# Patient Record
Sex: MALE | Race: WHITE | NOT HISPANIC OR LATINO | Employment: FULL TIME | URBAN - METROPOLITAN AREA
[De-identification: names, ages, dates, MRNs, and addresses within clinical notes are randomized per-mention and may not be internally consistent; named-entity substitution may affect disease eponyms.]

---

## 2018-12-26 ENCOUNTER — OFFICE VISIT (OUTPATIENT)
Dept: URGENT CARE | Facility: CLINIC | Age: 23
End: 2018-12-26
Payer: COMMERCIAL

## 2018-12-26 VITALS
HEART RATE: 75 BPM | HEIGHT: 70 IN | SYSTOLIC BLOOD PRESSURE: 120 MMHG | TEMPERATURE: 97.5 F | OXYGEN SATURATION: 99 % | BODY MASS INDEX: 25.77 KG/M2 | DIASTOLIC BLOOD PRESSURE: 78 MMHG | RESPIRATION RATE: 18 BRPM | WEIGHT: 180 LBS

## 2018-12-26 DIAGNOSIS — K62.5 RECTAL BLEED: Primary | ICD-10-CM

## 2018-12-26 PROCEDURE — 99203 OFFICE O/P NEW LOW 30 MIN: CPT | Performed by: EMERGENCY MEDICINE

## 2018-12-26 RX ORDER — DOCUSATE SODIUM 100 MG/1
100 CAPSULE, LIQUID FILLED ORAL 2 TIMES DAILY
Qty: 10 CAPSULE | Refills: 0 | Status: SHIPPED | OUTPATIENT
Start: 2018-12-26

## 2018-12-26 NOTE — PATIENT INSTRUCTIONS
Anal Fissure   WHAT YOU NEED TO KNOW:   An anal fissure is a cut or tear in the tissue inside your anus  An anal fissure may be acute or chronic  An acute anal fissure is usually small and shallow and often heals without treatment  A chronic fissure may last longer than a month and will usually require treatment  A chronic anal fissure comes back after treatment  DISCHARGE INSTRUCTIONS:   Medicine:   · Topical medicine: Topical medicine may be put just inside your anus  This medicine may help your anal muscle relax and increase blood flow to your anus  This medicine may contain anesthesia to help decrease your pain  Your healthcare provider will teach you the right way to use topical medicine  · Stool softeners: Your healthcare provider may also give you medicine that makes your bowel movements softer  This helps prevent constipation  You will be less likely to strain and cause an anal fissure if you are not constipated  · Take your medicine as directed  Contact your healthcare provider if you think your medicine is not helping or if you have side effects  Tell him or her if you are allergic to any medicine  Keep a list of the medicines, vitamins, and herbs you take  Include the amounts, and when and why you take them  Bring the list or the pill bottles to follow-up visits  Carry your medicine list with you in case of an emergency  Follow up with your healthcare provider as directed: Your healthcare provider will need to make sure your anal fissure heals  Write down your questions so you remember to ask them during your visits  Bathing: You may need to soak in a warm tub or take a sitz bath  This may help decrease pain and swelling  You may need to do this more than once a day  Ask your healthcare provider how to use a sitz bath and how often you should bathe  Bowel care:  Do not ignore the urge to have a bowel movement  Do not strain  Clean the area gently after every bowel movement     Nutrition:  Eat foods that are high in fiber to help keep your bowel movements soft  High-fiber foods include fruits, vegetables, and whole grains  Drink more liquids to help soften your bowel movements  This will help prevent you from straining  Ask your healthcare provider how much liquid you should drink each day  Sexual intercourse:  Avoid anal intercourse for as long as directed by your healthcare provider  Anal intercourse may make it harder for your anal fissure to heal  It may also tear more tissue around your anus  Contact your healthcare provider if:   · You have a fever  · You still have pain after taking pain medicine  · You are unable to have a bowel movement  · You have spasms in your anus that do not stop  · You have questions or concerns about your condition or care  Seek care immediately or call 911 if:   · You have very bad pain in or around your anus  · You have bleeding from your anus that does not stop  © 2017 [employer] Information is for End User's use only and may not be sold, redistributed or otherwise used for commercial purposes  All illustrations and images included in CareNotes® are the copyrighted property of [employer]  or Albreto Lopez  The above information is an  only  It is not intended as medical advice for individual conditions or treatments  Talk to your doctor, nurse or pharmacist before following any medical regimen to see if it is safe and effective for you  Rectal Bleeding   WHAT YOU NEED TO KNOW:   What can cause rectal bleeding? · Constipation    · Hemorrhoids (swollen blood vessels in your rectum)    · Anal fissures (tears in the tissue inside your anus)    · Medical conditions, such as cancer, colitis, or diverticulitis     · Growths, such as tumors or polyps    · Medical treatments, such as radiation or rectal surgery  What increases my risk for rectal bleeding?    · Older age    · Certain medicines, such as blood thinners and NSAIDs    · Medical conditions, such as inflammatory bowel disease, liver disease, or HIV  What other signs and symptoms may happen with rectal bleeding? You may have pain in your rectum or anus  You may also have abdominal pain or cramping  How is the cause of rectal bleeding diagnosed? · Rectal exam:  Your healthcare provider may gently insert a gloved finger into your anus  He will collect a bowel movement sample and send it to a lab for tests  · Blood tests: You may need blood taken to check for anemia (low amount of red blood cells)  · CT scan: This test is also called a CAT scan  An x-ray machine uses a computer to take pictures of the organs and blood vessels in your abdomen  The pictures may show problems that could cause bleeding  You may be given a dye before the pictures are taken to help healthcare providers see the pictures better  Tell the healthcare provider if you have ever had an allergic reaction to contrast dye  · Colonoscopy: This is a procedure to look inside your lower bowel  It may show where the bleeding is coming from and what is causing it  A tube with a light on the end will be put into your anus and then moved into your colon  If your healthcare provider finds a growth, he may remove it  · Endoscopy: This is a procedure to look inside your upper bowel  It may show where the bleeding is coming from and what is causing it  A tube with a light on the end is inserted into your throat and moved down into your stomach and upper bowel  If your healthcare provider finds a growth, he may remove it  He may put a shot of medicine in bleeding areas to narrow the blood vessels and stop the bleeding  Heat, laser, or electric currents may also be used to make the blood clot  How is rectal bleeding treated? · Medicine:      ¨ Pain medicine: You may be given a prescription medicine to decrease pain   Do not wait until the pain is severe before you take this medicine  ¨ Vasoconstrictors: This medicine decreases the size of your blood vessels and may help stop the bleeding  ¨ Iron supplement:  Iron helps your body make more red blood cells  ¨ Steroids: This medicine decreases inflammation in your rectum  It may be applied as a cream, ointment, or lotion  · IV:  You may need an IV if you are dehydrated and need extra liquids  · Blood transfusion:  You will get whole or parts of blood through an IV during a transfusion  Blood is tested for diseases, such as hepatitis and HIV, to be sure it is safe  · Surgery: You may need surgery to remove hemorrhoids, tumors, or polyps  What are the risks of rectal bleeding? · You may have abdominal pain or damage to nearby organs and blood vessels with surgery  Even with treatment, rectal bleeding may continue  Or, it may go away for a time and start again  · Without treatment, you may continue to have pain and cramping  You may develop anemia  You may need a blood transfusion  You may lose a large amount of blood  This can be life-threatening  How can I manage my symptoms? Ask your healthcare provider how much liquid to drink each day and which liquids are best for you  This will help prevent dehydration and constipation  When should I contact my healthcare provider? · You have a fever  · Your rectal bleeding stopped for a time, but has started again  · You have nausea  · You have cold, sweaty, pale skin  · You have changes in your bowel movements, such as diarrhea  · You have questions or concerns about your condition or care  When should I seek immediate care or call 911? · You are breathing faster than usual     · You are dizzy, lightheaded, or feel faint  · You are confused or cannot think clearly  · You urinate less than usual or not at all  · Your rectal bleeding is constant or heavy  · You have severe abdominal pain or cramping    CARE AGREEMENT:   You have the right to help plan your care  Learn about your health condition and how it may be treated  Discuss treatment options with your caregivers to decide what care you want to receive  You always have the right to refuse treatment  The above information is an  only  It is not intended as medical advice for individual conditions or treatments  Talk to your doctor, nurse or pharmacist before following any medical regimen to see if it is safe and effective for you  © 2017 [address]  Information is for End User's use only and may not be sold, redistributed or otherwise used for commercial purposes  All illustrations and images included in CareNotes® are the copyrighted property of [employer]  or Alberto Lopez

## 2018-12-26 NOTE — PROGRESS NOTES
[facility]        NAME: Alexy Cuevas is a 24 y o  male  : 1995    MRN: [id number]  DATE: 2018  TIME: 5:59 PM    Assessment and Plan   Rectal bleed [K62 5]  1  Rectal bleed  docusate sodium (COLACE) 100 mg capsule         Patient Instructions     Patient Instructions   Anal Fissure   WHAT YOU NEED TO KNOW:   An anal fissure is a cut or tear in the tissue inside your anus  An anal fissure may be acute or chronic  An acute anal fissure is usually small and shallow and often heals without treatment  A chronic fissure may last longer than a month and will usually require treatment  A chronic anal fissure comes back after treatment  DISCHARGE INSTRUCTIONS:   Medicine:   · Topical medicine: Topical medicine may be put just inside your anus  This medicine may help your anal muscle relax and increase blood flow to your anus  This medicine may contain anesthesia to help decrease your pain  Your healthcare provider will teach you the right way to use topical medicine  · Stool softeners: Your healthcare provider may also give you medicine that makes your bowel movements softer  This helps prevent constipation  You will be less likely to strain and cause an anal fissure if you are not constipated  · Take your medicine as directed  Contact your healthcare provider if you think your medicine is not helping or if you have side effects  Tell him or her if you are allergic to any medicine  Keep a list of the medicines, vitamins, and herbs you take  Include the amounts, and when and why you take them  Bring the list or the pill bottles to follow-up visits  Carry your medicine list with you in case of an emergency  Follow up with your healthcare provider as directed: Your healthcare provider will need to make sure your anal fissure heals  Write down your questions so you remember to ask them during your visits  Bathing: You may need to soak in a warm tub or take a sitz bath   This may help decrease pain and swelling  You may need to do this more than once a day  Ask your healthcare provider how to use a sitz bath and how often you should bathe  Bowel care:  Do not ignore the urge to have a bowel movement  Do not strain  Clean the area gently after every bowel movement  Nutrition:  Eat foods that are high in fiber to help keep your bowel movements soft  High-fiber foods include fruits, vegetables, and whole grains  Drink more liquids to help soften your bowel movements  This will help prevent you from straining  Ask your healthcare provider how much liquid you should drink each day  Sexual intercourse:  Avoid anal intercourse for as long as directed by your healthcare provider  Anal intercourse may make it harder for your anal fissure to heal  It may also tear more tissue around your anus  Contact your healthcare provider if:   · You have a fever  · You still have pain after taking pain medicine  · You are unable to have a bowel movement  · You have spasms in your anus that do not stop  · You have questions or concerns about your condition or care  Seek care immediately or call 911 if:   · You have very bad pain in or around your anus  · You have bleeding from your anus that does not stop  © 2017 [facility] Information is for End User's use only and may not be sold, redistributed or otherwise used for commercial purposes  All illustrations and images included in CareNotes® are the copyrighted property of [employer]  or Alberto Lopez  The above information is an  only  It is not intended as medical advice for individual conditions or treatments  Talk to your doctor, nurse or pharmacist before following any medical regimen to see if it is safe and effective for you  Rectal Bleeding   WHAT YOU NEED TO KNOW:   What can cause rectal bleeding?    · Constipation    · Hemorrhoids (swollen blood vessels in your rectum)    · Anal fissures (tears in the tissue inside your anus)    · Medical conditions, such as cancer, colitis, or diverticulitis     · Growths, such as tumors or polyps    · Medical treatments, such as radiation or rectal surgery  What increases my risk for rectal bleeding? · Older age    · Certain medicines, such as blood thinners and NSAIDs    · Medical conditions, such as inflammatory bowel disease, liver disease, or HIV  What other signs and symptoms may happen with rectal bleeding? You may have pain in your rectum or anus  You may also have abdominal pain or cramping  How is the cause of rectal bleeding diagnosed? · Rectal exam:  Your healthcare provider may gently insert a gloved finger into your anus  He will collect a bowel movement sample and send it to a lab for tests  · Blood tests: You may need blood taken to check for anemia (low amount of red blood cells)  · CT scan: This test is also called a CAT scan  An x-ray machine uses a computer to take pictures of the organs and blood vessels in your abdomen  The pictures may show problems that could cause bleeding  You may be given a dye before the pictures are taken to help healthcare providers see the pictures better  Tell the healthcare provider if you have ever had an allergic reaction to contrast dye  · Colonoscopy: This is a procedure to look inside your lower bowel  It may show where the bleeding is coming from and what is causing it  A tube with a light on the end will be put into your anus and then moved into your colon  If your healthcare provider finds a growth, he may remove it  · Endoscopy: This is a procedure to look inside your upper bowel  It may show where the bleeding is coming from and what is causing it  A tube with a light on the end is inserted into your throat and moved down into your stomach and upper bowel  If your healthcare provider finds a growth, he may remove it   He may put a shot of medicine in bleeding areas to narrow the blood vessels and stop the bleeding  Heat, laser, or electric currents may also be used to make the blood clot  How is rectal bleeding treated? · Medicine:      ¨ Pain medicine: You may be given a prescription medicine to decrease pain  Do not wait until the pain is severe before you take this medicine  ¨ Vasoconstrictors: This medicine decreases the size of your blood vessels and may help stop the bleeding  ¨ Iron supplement:  Iron helps your body make more red blood cells  ¨ Steroids: This medicine decreases inflammation in your rectum  It may be applied as a cream, ointment, or lotion  · IV:  You may need an IV if you are dehydrated and need extra liquids  · Blood transfusion:  You will get whole or parts of blood through an IV during a transfusion  Blood is tested for diseases, such as hepatitis and HIV, to be sure it is safe  · Surgery: You may need surgery to remove hemorrhoids, tumors, or polyps  What are the risks of rectal bleeding? · You may have abdominal pain or damage to nearby organs and blood vessels with surgery  Even with treatment, rectal bleeding may continue  Or, it may go away for a time and start again  · Without treatment, you may continue to have pain and cramping  You may develop anemia  You may need a blood transfusion  You may lose a large amount of blood  This can be life-threatening  How can I manage my symptoms? Ask your healthcare provider how much liquid to drink each day and which liquids are best for you  This will help prevent dehydration and constipation  When should I contact my healthcare provider? · You have a fever  · Your rectal bleeding stopped for a time, but has started again  · You have nausea  · You have cold, sweaty, pale skin  · You have changes in your bowel movements, such as diarrhea  · You have questions or concerns about your condition or care  When should I seek immediate care or call 911?    · You are breathing faster than usual     · You are dizzy, lightheaded, or feel faint  · You are confused or cannot think clearly  · You urinate less than usual or not at all  · Your rectal bleeding is constant or heavy  · You have severe abdominal pain or cramping  CARE AGREEMENT:   You have the right to help plan your care  Learn about your health condition and how it may be treated  Discuss treatment options with your caregivers to decide what care you want to receive  You always have the right to refuse treatment  The above information is an  only  It is not intended as medical advice for individual conditions or treatments  Talk to your doctor, nurse or pharmacist before following any medical regimen to see if it is safe and effective for you  © 2017 [address] [employer]  Information is for End User's use only and may not be sold, redistributed or otherwise used for commercial purposes  All illustrations and images included in CareNotes® are the copyrighted property of A [employer]  or Alberto Lopez  Follow up with PCP in 3-5 days  Proceed to  ER if symptoms worsen  Chief Complaint     Chief Complaint   Patient presents with    Rectal Bleeding     noticed red blood upon wiping after bowel movement on Monday  History of Present Illness       Patient claims he noted a smear a bright red blood on toilet tissue today after an uneventful bowel movement  He claims the bowel movement was not painful, bloody or black  He denies similar symptoms in the past   He denies any rectal or anal pain at present  Review of Systems   Review of Systems   Constitutional: Negative for appetite change, chills and fever  Cardiovascular: Negative for palpitations  Gastrointestinal: Positive for anal bleeding  Negative for abdominal distention, abdominal pain, blood in stool, constipation, diarrhea, nausea, rectal pain and vomiting  Musculoskeletal: Negative for back pain  Skin: Negative for pallor  Neurological: Negative for syncope, light-headedness and headaches  Current Medications       Current Outpatient Prescriptions:     docusate sodium (COLACE) 100 mg capsule, Take 1 capsule (100 mg total) by mouth 2 (two) times a day, Disp: 10 capsule, Rfl: 0    Current Allergies     Allergies as of 12/26/2018    (No Known Allergies)            The following portions of the patient's history were reviewed and updated as appropriate: allergies, current medications, past family history, past medical history, past social history, past surgical history and problem list      Past Medical History:   Diagnosis Date    Known health problems: none        Past Surgical History:   Procedure Laterality Date    WRIST FRACTURE SURGERY         Family History   Problem Relation Age of Onset    No Known Problems Mother          Medications have been verified  Objective   /78 (BP Location: Left arm, Patient Position: Sitting, Cuff Size: Large)   Pulse 75   Temp 97 5 °F (36 4 °C) (Tympanic)   Resp 18   Ht 5' 10" (1 778 m)   Wt 81 6 kg (180 lb)   SpO2 99%   BMI 25 83 kg/m²        Physical Exam     Physical Exam   Constitutional: He is oriented to person, place, and time  He appears well-developed and well-nourished  No distress  HENT:   Right Ear: Tympanic membrane and ear canal normal    Left Ear: Tympanic membrane and ear canal normal    Nose: Mucosal edema and rhinorrhea present  Mouth/Throat: Posterior oropharyngeal erythema present  Abdominal: Soft  Bowel sounds are normal  He exhibits no distension  There is no tenderness  Genitourinary: Rectum normal  Rectal exam shows guaiac negative stool  Genitourinary Comments: No visible or palpable hemorrhoids  Stool guaiac negative  Neurological: He is alert and oriented to person, place, and time  Skin: Skin is warm and dry  He is not diaphoretic  No pallor  Psychiatric: He has a normal mood and affect  Nursing note and vitals reviewed

## 2022-11-19 ENCOUNTER — APPOINTMENT (OUTPATIENT)
Dept: LAB | Facility: HOSPITAL | Age: 27
End: 2022-11-19

## 2022-11-19 ENCOUNTER — OFFICE VISIT (OUTPATIENT)
Dept: LAB | Facility: HOSPITAL | Age: 27
End: 2022-11-19

## 2022-11-19 DIAGNOSIS — Z01.812 ENCOUNTER FOR PREPROCEDURAL LABORATORY EXAMINATION: ICD-10-CM

## 2022-11-19 LAB
ANION GAP SERPL CALCULATED.3IONS-SCNC: 7 MMOL/L (ref 4–13)
BASOPHILS # BLD AUTO: 0.03 THOUSANDS/ÂΜL (ref 0–0.1)
BASOPHILS NFR BLD AUTO: 1 % (ref 0–1)
BUN SERPL-MCNC: 26 MG/DL (ref 5–25)
CALCIUM SERPL-MCNC: 9.2 MG/DL (ref 8.3–10.1)
CHLORIDE SERPL-SCNC: 107 MMOL/L (ref 96–108)
CO2 SERPL-SCNC: 31 MMOL/L (ref 21–32)
CREAT SERPL-MCNC: 0.95 MG/DL (ref 0.6–1.3)
EOSINOPHIL # BLD AUTO: 0.32 THOUSAND/ÂΜL (ref 0–0.61)
EOSINOPHIL NFR BLD AUTO: 7 % (ref 0–6)
ERYTHROCYTE [DISTWIDTH] IN BLOOD BY AUTOMATED COUNT: 11.9 % (ref 11.6–15.1)
GFR SERPL CREATININE-BSD FRML MDRD: 109 ML/MIN/1.73SQ M
GLUCOSE SERPL-MCNC: 57 MG/DL (ref 65–140)
HCT VFR BLD AUTO: 46.8 % (ref 36.5–49.3)
HGB BLD-MCNC: 15.5 G/DL (ref 12–17)
IMM GRANULOCYTES # BLD AUTO: 0.01 THOUSAND/UL (ref 0–0.2)
IMM GRANULOCYTES NFR BLD AUTO: 0 % (ref 0–2)
LYMPHOCYTES # BLD AUTO: 1.87 THOUSANDS/ÂΜL (ref 0.6–4.47)
LYMPHOCYTES NFR BLD AUTO: 40 % (ref 14–44)
MCH RBC QN AUTO: 30.6 PG (ref 26.8–34.3)
MCHC RBC AUTO-ENTMCNC: 33.1 G/DL (ref 31.4–37.4)
MCV RBC AUTO: 93 FL (ref 82–98)
MONOCYTES # BLD AUTO: 0.34 THOUSAND/ÂΜL (ref 0.17–1.22)
MONOCYTES NFR BLD AUTO: 7 % (ref 4–12)
NEUTROPHILS # BLD AUTO: 2.15 THOUSANDS/ÂΜL (ref 1.85–7.62)
NEUTS SEG NFR BLD AUTO: 45 % (ref 43–75)
NRBC BLD AUTO-RTO: 0 /100 WBCS
PLATELET # BLD AUTO: 238 THOUSANDS/UL (ref 149–390)
PMV BLD AUTO: 9.9 FL (ref 8.9–12.7)
POTASSIUM SERPL-SCNC: 4 MMOL/L (ref 3.5–5.3)
RBC # BLD AUTO: 5.06 MILLION/UL (ref 3.88–5.62)
SODIUM SERPL-SCNC: 145 MMOL/L (ref 135–147)
WBC # BLD AUTO: 4.72 THOUSAND/UL (ref 4.31–10.16)

## 2022-11-20 LAB
ATRIAL RATE: 87 BPM
P AXIS: 76 DEGREES
PR INTERVAL: 156 MS
QRS AXIS: 92 DEGREES
QRSD INTERVAL: 90 MS
QT INTERVAL: 360 MS
QTC INTERVAL: 433 MS
T WAVE AXIS: 66 DEGREES
VENTRICULAR RATE: 87 BPM

## 2023-10-20 ENCOUNTER — OFFICE VISIT (OUTPATIENT)
Dept: URGENT CARE | Facility: CLINIC | Age: 28
End: 2023-10-20
Payer: COMMERCIAL

## 2023-10-20 VITALS
HEART RATE: 110 BPM | BODY MASS INDEX: 25.91 KG/M2 | HEIGHT: 70 IN | DIASTOLIC BLOOD PRESSURE: 76 MMHG | TEMPERATURE: 97.8 F | WEIGHT: 181 LBS | OXYGEN SATURATION: 96 % | RESPIRATION RATE: 20 BRPM | SYSTOLIC BLOOD PRESSURE: 139 MMHG

## 2023-10-20 DIAGNOSIS — S01.81XA LACERATION OF OTHER PART OF HEAD WITHOUT FOREIGN BODY, INITIAL ENCOUNTER: Primary | ICD-10-CM

## 2023-10-20 PROCEDURE — 12011 RPR F/E/E/N/L/M 2.5 CM/<: CPT

## 2023-10-20 PROCEDURE — 99213 OFFICE O/P EST LOW 20 MIN: CPT

## 2023-10-20 RX ORDER — MULTIVITAMIN
1 CAPSULE ORAL DAILY
COMMUNITY

## 2023-10-20 RX ORDER — CHLORAMPHENICOL PALMITATE
POWDER (GRAM) MISCELLANEOUS
COMMUNITY
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
## 2023-10-20 NOTE — PATIENT INSTRUCTIONS
Suture removal in 3-5 days  Standard wound care. Cleanse daily   Monitor for signs of infection   Follow up with PCP in 3-5 days. Proceed to  ER if symptoms worsen. Care For Your Stitches   AMBULATORY CARE:   Stitches,  or sutures, are used to close cuts and wounds on the skin. Stitches need to be removed after your wound has healed. Seek care immediately if:   Your stitches come apart. Blood soaks through your bandages. You suddenly cannot move your injured joint. You have sudden numbness around your wound. You see red streaks coming from your wound. Contact your healthcare provider if:   You have a fever and chills. Your wound is red, warm, swollen, or leaking pus. There is a bad smell coming from your wound. You have increased pain in the wound area. You have questions or concerns about your condition or care. Care for your stitches:   Protect the stitches. You may need to cover your stitches with a bandage for 24 to 48 hours, or as directed. Do not bump or hit the suture area. This could open the wound. Do not trim or shorten the ends of your stitches. If they rub on your clothing, put a gauze bandage between the stitches and your clothes. Clean the area as directed. Carefully wash your wound with soap and water. For mouth and lip wounds, rinse your mouth after meals and at bedtime. Ask your healthcare provider what to use to rinse your mouth. If you have a scalp wound, you may gently wash your hair every 2 days with mild shampoo. Do not use hair products, such as hair spray. Check your wound for signs of infection when you clean it. Signs include redness, swelling, and pus. Keep the area dry as directed. Wait 12 to 24 hours after you receive your stitches before you take a shower. Take showers instead of baths. Do not take a bath or swim. Your healthcare provider will give you instructions for bathing with your stitches.     Help your wound heal:   Elevate your wound.  Keep your wound above the level of your heart as often as you can. This will help decrease swelling and pain. Prop the area on pillows or blankets, if possible, to keep it elevated comfortably. Limit activity. Do not stretch the skin around your wound. This will help prevent bleeding and swelling. Follow up with your healthcare provider as directed: You may need to return to have your stitches removed. Write down your questions so you remember to ask them during your visits. © Copyright [facility] 2023 Information is for End User's use only and may not be sold, redistributed or otherwise used for commercial purposes. The above information is an  only. It is not intended as medical advice for individual conditions or treatments. Talk to your doctor, nurse or pharmacist before following any medical regimen to see if it is safe and effective for you.

## 2023-10-27 ENCOUNTER — OFFICE VISIT (OUTPATIENT)
Dept: URGENT CARE | Facility: CLINIC | Age: 28
End: 2023-10-27
Payer: COMMERCIAL

## 2023-10-27 VITALS
DIASTOLIC BLOOD PRESSURE: 76 MMHG | RESPIRATION RATE: 18 BRPM | BODY MASS INDEX: 25.77 KG/M2 | HEART RATE: 76 BPM | WEIGHT: 180 LBS | SYSTOLIC BLOOD PRESSURE: 133 MMHG | TEMPERATURE: 97.1 F | OXYGEN SATURATION: 97 % | HEIGHT: 70 IN

## 2023-10-27 DIAGNOSIS — Z48.02 ENCOUNTER FOR REMOVAL OF SUTURES: Primary | ICD-10-CM

## 2023-10-27 PROCEDURE — 99211 OFF/OP EST MAY X REQ PHY/QHP: CPT

## 2023-10-27 NOTE — PROGRESS NOTES
[facility]        NAME: Hiram Booth is a 28 y.o. male  : 1995    MRN: [id number]  DATE: 2023  TIME: 12:12 PM    Assessment and Plan   Encounter for removal of sutures [Z48.02]  1. Encounter for removal of sutures  Suture removal        Suture removal    Date/Time: 10/27/2023 11:55 AM    Performed by: EDUARDO Hernandez  Authorized by: Nicki Ragland MD  Universal Protocol:  Procedure performed by: MIRANDA Wills)  Consent: Verbal consent obtained. Risks and benefits: risks, benefits and alternatives were discussed  Consent given by: patient  Patient understanding: patient states understanding of the procedure being performed  Patient identity confirmed: verbally with patient      Patient location:  Clinic  Location:     Laterality:  Right    Location:  [address] [place] [address] location:  Eyebrow    Eyebrow location:  R eyebrow  Procedure details: Tools used:  Suture removal kit    Wound appearance:  No sign(s) of infection and good wound healing    Number of sutures removed:  3  Post-procedure details:     Post-removal:  No dressing applied    Patient tolerance of procedure: Tolerated well, no immediate complications    No sign of infection. Good wound healing. 3 sutures removed by MIRANDA Wills under my supervision. Tolerated well. Encouraged continued supportive measures. Standard wound care. Monitor for signs of infection. Follow up with PCP in 3-5 days or proceed to emergency department for worsening symptoms. Patient verbalized understanding of instructions given. Patient Instructions     Patient Instructions   Stitches removed  Standard wound care  Monitor for signs of infection   Follow up with PCP in 3-5 days. Proceed to  ER if symptoms worsen. Stitches Removal   AMBULATORY CARE:   What you need to know about stitches removal:  Stitches are usually removed within 14 days, depending on the location of the wound.  Your healthcare provider will tell you when to return to have your stitches removed. Your provider will use sterile forceps or tweezers to  the knot of each stitch. He or she will cut the stitch with scissors and pull the stitch out. You may feel a slight tug as the stitch comes out. Seek care immediately if:   Your wound splits open or is starting to come apart. You suddenly cannot move your injured joint. You have sudden numbness around your wound. You see red streaks coming from your wound. Contact your healthcare provider if:   You have a fever and chills. Your wound is red, warm, swollen, or leaking pus. There is a bad smell coming from your wound. You have increased pain in the wound area. You have questions or concerns about your condition or care. Care for the area after the stitches are removed:   Do not pull medical tape off. Your provider may place small strips of medical tape across your wound after the stitches have been removed. These strips will peel and fall of on their own. Do not pull them off. Clean the area as directed. Carefully wash the area with soap and water. Pat the area dry with a clean towel. Check the area for signs of infection, such as redness, swelling, or pus. Also check that the wound is not coming apart. Protect your wound. Your wound can swell, bleed, or split open if it is stretched or bumped. You may need to wear a bandage that supports your wound until it is completely healed. Care for a scar. You may have a scar after the stitches are removed. Use sunblock if the area is exposed to the sun. Apply it every day after the stitches are removed. This will help prevent skin discoloration. Talk to your healthcare provider about medicines you can use to make the scar less visible. Some medicines are available without a prescription. Follow up with your healthcare provider as directed: You may need to return in 3 to 5 days if the stitches are on your face. Stitches on your scalp need to be removed after 7 to 14 days. Stitches over joints may remain in place up to 14 days. Write down your questions so you remember to ask them during your visits. © Copyright [employer] 2023 Information is for End User's use only and may not be sold, redistributed or otherwise used for commercial purposes. The above information is an  only. It is not intended as medical advice for individual conditions or treatments. Talk to your doctor, nurse or pharmacist before following any medical regimen to see if it is safe and effective for you. Chief Complaint     Chief Complaint   Patient presents with    Suture / Staple Removal         History of Present Illness       79-year-old male with no significant past medical history presents today for suture removal.  Patient reports sustaining laceration to right eyebrow on 10/20/2023.  3 sutures placed. Patient denies drainage, redness, or swelling. Cleansing daily with antibacterial soap and applying topical antibiotic ointment. No concern for infection. Denies fever, chills, or flu-like symptoms. Review of Systems   Review of Systems   Constitutional:  Negative for chills and fever. Respiratory:  Negative for cough and shortness of breath. Cardiovascular:  Negative for chest pain. Gastrointestinal:  Negative for abdominal pain, diarrhea, nausea and vomiting. Musculoskeletal:  Negative for arthralgias and myalgias. Skin:  Positive for wound. Negative for color change.          Current Medications       Current Outpatient Medications:     Cholecalciferol 25 MCG (1000 UT) tablet, Take 4,000 Units by mouth daily, Disp: , Rfl:     Creatinine POWD, Use With cardatine altartrate in pwoder, Disp: , Rfl:     MAGNESIUM PO, Take by mouth, Disp: , Rfl:     Multiple Vitamin (multivitamin) capsule, Take 1 capsule by mouth daily, Disp: , Rfl:     Multiple Vitamins-Minerals (ZINC PO), Take by mouth, Disp: , Rfl: docusate sodium (COLACE) 100 mg capsule, Take 1 capsule (100 mg total) by mouth 2 (two) times a day (Patient not taking: Reported on 10/20/2023), Disp: 10 capsule, Rfl: 0    Current Allergies     Allergies as of 10/27/2023    (No Known Allergies)            The following portions of the patient's history were reviewed and updated as appropriate: allergies, current medications, past family history, past medical history, past social history, past surgical history and problem list.     Past Medical History:   Diagnosis Date    Known health problems: none        Past Surgical History:   Procedure Laterality Date    WRIST FRACTURE SURGERY         History reviewed. No pertinent family history. Medications have been verified. Objective   /76   Pulse 76   Temp (!) 97.1 °F (36.2 °C)   Resp 18   Ht 5' 10" (1.778 m)   Wt 81.6 kg (180 lb)   SpO2 97%   BMI 25.83 kg/m²   No LMP for male patient. Physical Exam     Physical Exam  Vitals and nursing note reviewed. Constitutional:       General: He is not in acute distress. Appearance: He is not toxic-appearing. HENT:      Head: Normocephalic. Nose: Nose normal.      Mouth/Throat:      Mouth: Mucous membranes are moist.      Pharynx: Oropharynx is clear. Eyes:      Conjunctiva/sclera: Conjunctivae normal.   Pulmonary:      Effort: Pulmonary effort is normal.   Skin:     General: Skin is warm and dry. Findings: Laceration present. Neurological:      Mental Status: He is alert and oriented to person, place, and time. Gait: Gait is intact.    Psychiatric:         Mood and Affect: Mood normal.         Behavior: Behavior normal.

## 2023-10-27 NOTE — PATIENT INSTRUCTIONS
Stitches removed  Standard wound care  Monitor for signs of infection   Follow up with PCP in 3-5 days. Proceed to  ER if symptoms worsen. Stitches Removal   AMBULATORY CARE:   What you need to know about stitches removal:  Stitches are usually removed within 14 days, depending on the location of the wound. Your healthcare provider will tell you when to return to have your stitches removed. Your provider will use sterile forceps or tweezers to  the knot of each stitch. He or she will cut the stitch with scissors and pull the stitch out. You may feel a slight tug as the stitch comes out. Seek care immediately if:   Your wound splits open or is starting to come apart. You suddenly cannot move your injured joint. You have sudden numbness around your wound. You see red streaks coming from your wound. Contact your healthcare provider if:   You have a fever and chills. Your wound is red, warm, swollen, or leaking pus. There is a bad smell coming from your wound. You have increased pain in the wound area. You have questions or concerns about your condition or care. Care for the area after the stitches are removed:   Do not pull medical tape off. Your provider may place small strips of medical tape across your wound after the stitches have been removed. These strips will peel and fall of on their own. Do not pull them off. Clean the area as directed. Carefully wash the area with soap and water. Pat the area dry with a clean towel. Check the area for signs of infection, such as redness, swelling, or pus. Also check that the wound is not coming apart. Protect your wound. Your wound can swell, bleed, or split open if it is stretched or bumped. You may need to wear a bandage that supports your wound until it is completely healed. Care for a scar. You may have a scar after the stitches are removed. Use sunblock if the area is exposed to the sun.  Apply it every day after the stitches are removed. This will help prevent skin discoloration. Talk to your healthcare provider about medicines you can use to make the scar less visible. Some medicines are available without a prescription. Follow up with your healthcare provider as directed: You may need to return in 3 to 5 days if the stitches are on your face. Stitches on your scalp need to be removed after 7 to 14 days. Stitches over joints may remain in place up to 14 days. Write down your questions so you remember to ask them during your visits. © Copyright [employer] 2023 Information is for End User's use only and may not be sold, redistributed or otherwise used for commercial purposes. The above information is an  only. It is not intended as medical advice for individual conditions or treatments. Talk to your doctor, nurse or pharmacist before following any medical regimen to see if it is safe and effective for you.

## 2024-08-27 ENCOUNTER — TELEPHONE (OUTPATIENT)
Age: 29
End: 2024-08-27

## 2024-08-27 NOTE — TELEPHONE ENCOUNTER
Pt called asking to be a self referral to Dr. Mc to discuss Brachial Plexus surgery because someone he knows recommended him.  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  Informed the pt of this and he states he desired to do some more research and call back if he desires to proceed with self referral.